# Patient Record
Sex: MALE | Race: WHITE | Employment: UNEMPLOYED | URBAN - METROPOLITAN AREA
[De-identification: names, ages, dates, MRNs, and addresses within clinical notes are randomized per-mention and may not be internally consistent; named-entity substitution may affect disease eponyms.]

---

## 2019-12-04 ENCOUNTER — TELEPHONE (OUTPATIENT)
Dept: CONSULT | Facility: CLINIC | Age: 34
End: 2019-12-04

## 2020-12-29 ENCOUNTER — ANCILLARY PROCEDURE (OUTPATIENT)
Dept: GENERAL RADIOLOGY | Facility: CLINIC | Age: 35
End: 2020-12-29
Attending: NURSE PRACTITIONER
Payer: COMMERCIAL

## 2020-12-29 ENCOUNTER — OFFICE VISIT (OUTPATIENT)
Dept: FAMILY MEDICINE | Facility: CLINIC | Age: 35
End: 2020-12-29
Payer: COMMERCIAL

## 2020-12-29 VITALS
SYSTOLIC BLOOD PRESSURE: 123 MMHG | BODY MASS INDEX: 30.01 KG/M2 | DIASTOLIC BLOOD PRESSURE: 96 MMHG | WEIGHT: 198 LBS | TEMPERATURE: 97.4 F | HEART RATE: 108 BPM | HEIGHT: 68 IN | OXYGEN SATURATION: 97 %

## 2020-12-29 DIAGNOSIS — M75.41 IMPINGEMENT SYNDROME, SHOULDER, RIGHT: ICD-10-CM

## 2020-12-29 DIAGNOSIS — I42.9 CARDIOMYOPATHY, UNSPECIFIED TYPE (H): Primary | ICD-10-CM

## 2020-12-29 DIAGNOSIS — F40.01 PANIC DISORDER WITH AGORAPHOBIA: ICD-10-CM

## 2020-12-29 DIAGNOSIS — I42.9 CARDIOMYOPATHY, UNSPECIFIED TYPE (H): ICD-10-CM

## 2020-12-29 DIAGNOSIS — R74.8 ELEVATED LIVER ENZYMES: ICD-10-CM

## 2020-12-29 PROBLEM — M54.42 CHRONIC MIDLINE LOW BACK PAIN WITH LEFT-SIDED SCIATICA: Status: ACTIVE | Noted: 2017-12-15

## 2020-12-29 PROBLEM — I42.0 CARDIOMYOPATHY, DILATED, NONISCHEMIC (H): Status: ACTIVE | Noted: 2020-12-29

## 2020-12-29 PROBLEM — F33.1 MAJOR DEPRESSIVE DISORDER, RECURRENT, MODERATE (H): Status: ACTIVE | Noted: 2017-08-01

## 2020-12-29 PROBLEM — I50.21 ACUTE SYSTOLIC (CONGESTIVE) HEART FAILURE (H): Status: ACTIVE | Noted: 2020-11-30

## 2020-12-29 PROBLEM — Z98.890 S/P LUMBAR DISCECTOMY: Status: ACTIVE | Noted: 2018-03-26

## 2020-12-29 PROBLEM — F12.11 CANNABIS USE DISORDER, MILD, IN EARLY REMISSION: Status: ACTIVE | Noted: 2017-08-01

## 2020-12-29 PROBLEM — I50.22 CHF (CONGESTIVE HEART FAILURE), NYHA CLASS III, CHRONIC, SYSTOLIC (H): Status: ACTIVE | Noted: 2020-11-27

## 2020-12-29 PROBLEM — G89.29 CHRONIC MIDLINE LOW BACK PAIN WITH LEFT-SIDED SCIATICA: Status: ACTIVE | Noted: 2017-12-15

## 2020-12-29 PROBLEM — F15.90 STIMULANT USE DISORDER: Status: ACTIVE | Noted: 2017-07-01

## 2020-12-29 PROBLEM — F41.1 GENERALIZED ANXIETY DISORDER: Status: ACTIVE | Noted: 2017-08-01

## 2020-12-29 LAB
ALBUMIN SERPL-MCNC: 3.7 G/DL (ref 3.4–5)
ALP SERPL-CCNC: 146 U/L (ref 40–150)
ALT SERPL W P-5'-P-CCNC: 127 U/L (ref 0–70)
ANION GAP SERPL CALCULATED.3IONS-SCNC: 2 MMOL/L (ref 3–14)
AST SERPL W P-5'-P-CCNC: 48 U/L (ref 0–45)
BILIRUB SERPL-MCNC: 1.3 MG/DL (ref 0.2–1.3)
BUN SERPL-MCNC: 20 MG/DL (ref 7–30)
CALCIUM SERPL-MCNC: 9.6 MG/DL (ref 8.5–10.1)
CHLORIDE SERPL-SCNC: 104 MMOL/L (ref 94–109)
CO2 SERPL-SCNC: 30 MMOL/L (ref 20–32)
CREAT SERPL-MCNC: 1.05 MG/DL (ref 0.66–1.25)
GFR SERPL CREATININE-BSD FRML MDRD: >90 ML/MIN/{1.73_M2}
GLUCOSE SERPL-MCNC: 114 MG/DL (ref 70–99)
POTASSIUM SERPL-SCNC: 5.1 MMOL/L (ref 3.4–5.3)
PROT SERPL-MCNC: 7.8 G/DL (ref 6.8–8.8)
SODIUM SERPL-SCNC: 136 MMOL/L (ref 133–144)

## 2020-12-29 PROCEDURE — 71046 X-RAY EXAM CHEST 2 VIEWS: CPT | Performed by: RADIOLOGY

## 2020-12-29 PROCEDURE — 36415 COLL VENOUS BLD VENIPUNCTURE: CPT | Performed by: NURSE PRACTITIONER

## 2020-12-29 PROCEDURE — 80053 COMPREHEN METABOLIC PANEL: CPT | Performed by: NURSE PRACTITIONER

## 2020-12-29 PROCEDURE — 99204 OFFICE O/P NEW MOD 45 MIN: CPT | Performed by: NURSE PRACTITIONER

## 2020-12-29 RX ORDER — GABAPENTIN 600 MG/1
600 TABLET ORAL 2 TIMES DAILY
COMMUNITY
Start: 2020-01-23

## 2020-12-29 RX ORDER — NALTREXONE HYDROCHLORIDE 50 MG/1
50 TABLET, FILM COATED ORAL
COMMUNITY
Start: 2020-11-30

## 2020-12-29 RX ORDER — MELOXICAM 15 MG/1
15 TABLET ORAL
COMMUNITY
Start: 2020-11-20

## 2020-12-29 RX ORDER — DULOXETINE 40 MG/1
40 CAPSULE, DELAYED RELEASE ORAL
COMMUNITY
End: 2021-02-09

## 2020-12-29 RX ORDER — ASPIRIN 81 MG/1
81 TABLET, CHEWABLE ORAL
COMMUNITY
Start: 2020-11-06

## 2020-12-29 RX ORDER — HYDROXYZINE HYDROCHLORIDE 25 MG/1
75-100 TABLET, FILM COATED ORAL 3 TIMES DAILY
COMMUNITY

## 2020-12-29 RX ORDER — SPIRONOLACTONE 25 MG/1
12.5 TABLET ORAL
COMMUNITY
Start: 2020-12-04

## 2020-12-29 RX ORDER — FUROSEMIDE 20 MG
60 TABLET ORAL DAILY
COMMUNITY
Start: 2020-12-03

## 2020-12-29 RX ORDER — GABAPENTIN 100 MG/1
100 CAPSULE ORAL 3 TIMES DAILY
COMMUNITY

## 2020-12-29 RX ORDER — LAMOTRIGINE 25 MG/1
50 TABLET ORAL
COMMUNITY
End: 2021-01-01

## 2020-12-29 RX ORDER — VALSARTAN 160 MG/1
160 TABLET ORAL 2 TIMES DAILY
COMMUNITY
Start: 2020-12-03

## 2020-12-29 ASSESSMENT — MIFFLIN-ST. JEOR: SCORE: 1806.59

## 2020-12-29 NOTE — LETTER
January 6, 2021      Jamin Le  [address] [facility] [facility] [address]        Dear ,    We are writing to inform you of your test results. We have been unable to reach you by phone.     Please call patient and let him know that his labs are normal except for elevated liver enzymes.  I attempted to call him and was unable to reach him.  I am ordering urine testing and labs testing for follow-up along with an US.  He can make an appointment in radiology and lab to complete these and then he should follow-up with a primary care provider to establish care.  I will also put in a referral to psychiatry for follow-up on his anxiety and panic attacks for further evaluation and treatment.  Let me know if he has any questions.  Lidia Lopez NP     Please call [phone] to schedule lab. Ultrasound can be scheduled by calling [phone].     Psychiatry will call you to schedule a appointment. If you do not receive a call, please call the clinic at [phone].       Resulted Orders   Comprehensive metabolic panel (BMP + Alb, Alk Phos, ALT, AST, Total. Bili, TP)   Result Value Ref Range    Sodium 136 133 - 144 mmol/L    Potassium 5.1 3.4 - 5.3 mmol/L    Chloride 104 94 - 109 mmol/L    Carbon Dioxide 30 20 - 32 mmol/L    Anion Gap 2 (L) 3 - 14 mmol/L    Glucose 114 (H) 70 - 99 mg/dL      Comment:      Non Fasting    Urea Nitrogen 20 7 - 30 mg/dL    Creatinine 1.05 0.66 - 1.25 mg/dL    GFR Estimate >90 >60 mL/min/[1.73_m2]      Comment:      Non  GFR Calc  Starting 12/18/2018, serum creatinine based estimated GFR (eGFR) will be   calculated using the Chronic Kidney Disease Epidemiology Collaboration   (CKD-EPI) equation.      GFR Estimate If Black >90 >60 mL/min/[1.73_m2]      Comment:       GFR Calc  Starting 12/18/2018, serum creatinine based estimated GFR (eGFR) will be   calculated using the Chronic Kidney Disease Epidemiology Collaboration   (CKD-EPI) equation.       Calcium 9.6 8.5 - 10.1 mg/dL    Bilirubin Total 1.3 0.2 - 1.3 mg/dL    Albumin 3.7 3.4 - 5.0 g/dL    Protein Total 7.8 6.8 - 8.8 g/dL    Alkaline Phosphatase 146 40 - 150 U/L     (H) 0 - 70 U/L    AST 48 (H) 0 - 45 U/L       If you have any questions or concerns, please call the clinic at the number listed above.       Sincerely,      Lidia Lopez NP

## 2020-12-29 NOTE — PROGRESS NOTES
Jamin Le is a 35 year old male who presents to clinic today for the following health issues:    HPI           Hospital Follow-up Visit:    Hospital/Nursing Home/IP Rehab Facility: [facility]  Date of Admission: 11/30/20  Date of Discharge: 12/03/20  Reason(s) for Admission: Cardiomyopathy       Was your hospitalization related to COVID-19? No   Problems taking medications regularly:  Sometimes because he forgets   Medication changes since discharge: Yes   Medication changes included:  - Lower dose lisinopril 5mg to higher dose valsartan 160 mg once daily to ease transition to Entresto if pursued as outpatient  - Carvedilol stopped at this time given recent decompensated heart failure and poor tolerance upon attempted restart  - Furosemide decreased to 60 mg daily from twice daily   - Spironolactone 12.5 mg daily started  - Other medications remained the same       Problems adhering to non-medication therapy:  None    Summary of hospitalization:  CareEverywhere information obtained and reviewed  Diagnostic Tests/Treatments reviewed.  Follow up needed: labs and CXR due to shortness of breath today  Other Healthcare Providers Involved in Patient s Care:         None  Update since discharge: fluctuating course.   Post Discharge Medication Reconciliation: discharge medications reconciled, continue medications without change.  Plan of care communicated with patient       Patient states he weighed 190# at discharge 3 weeks ago.  Patient states that he was feeling good for a couple days and then breathing worsened again.  Struggles with methamphetamines use on and off.  States he feels better when he takes that.  Compliant per patient with current medication regimen for anxiety.  Current shortness of breath during beginning of visit but improved by end of visit.  Patient has history of agoraphobia with panic attack.     Musculoskeletal problem/pain  Onset/Duration: 5 months  Description  Location:  "shoulder - right  Joint Swelling: unknown   Redness: unknown   Pain: YES- radiation to his elbow  Warmth: unknown   Intensity:  severe  Progression of Symptoms:  waxing and waning  Accompanying signs and symptoms:   Fevers: no  Numbness/tingling/weakness: YES- weakness  History  Trauma to the area: no  Recent illness:  no  Previous similar problem: YES  Previous evaluation:  YES  Was told from [facility] that he has a torn rotator cuff  Precipitating or alleviating factors:  Aggravating factors include: movement and laying on it   Therapies tried and outcome: ice, stretching, exercises, acetaminophen and Ibuprofen    Patient is complaining about his right shoulder have pain.  He states that in the past he had been told he tore his rotator cuff and did not do surgery at that time but symptoms are starting to occur again with pain.     Review of Systems   CONSTITUTIONAL: NEGATIVE for fever, chills, change in weight  ENT/MOUTH: NEGATIVE for ear, mouth and throat problems  RESP:POSITIVE for shortness of breath during beginning of appointment then improved by the end  CV: NEGATIVE for chest pain, palpitations or peripheral edema  MS:  Patient has pain in right shoulder  PSYCHIATRIC: POSITIVE forHx anxiety, Hx depression, Hx panic attacks and agoraphobia  ROS otherwise negative      Objective    BP (!) 123/96   Pulse 108   Temp 97.4  F (36.3  C) (Tympanic)   Ht 1.734 m (5' 8.25\")   Wt 89.8 kg (198 lb)   SpO2 97%   BMI 29.89 kg/m    Body mass index is 29.89 kg/m .  Physical Exam   GENERAL: healthy, alert and no distress  EYES: Eyes grossly normal to inspection, PERRL and conjunctivae and sclerae normal  NECK: no adenopathy, no asymmetry, masses, or scars and thyroid normal to palpation  RESP: lungs clear to auscultation - no rales, rhonchi or wheezes  CV: tachycardia, normal S1 S2, no S3 or S4, grade 3/6 blowing murmur heard best over the base of the heart, it is audible in upper chest but very mildly, peripheral pulses " strong and no peripheral edema  MS:  Mild limited active range of motion with right arm, negative drop arm testing, no pain with abduction or adduction, mild impingement with Neer's testing; passive ROM is normal.  ABDOMEN: soft, nontender, no hepatosplenomegaly, no masses and bowel sounds normal  PSYCH: anxious    Results for orders placed or performed in visit on 12/29/20   XR Chest 2 Views     Status: None    Narrative    CHEST TWO VIEWS December 29, 2020 3:16 PM     HISTORY: Cardiomyopathy, unspecified type (H).    COMPARISON: None.      Impression    IMPRESSION: PA and lateral views of the chest. Lungs are clear.  Cardiomegaly. No effusions are evident. No pneumothorax.    KARSTEN PARRA MD   Results for orders placed or performed in visit on 12/29/20   Comprehensive metabolic panel (BMP + Alb, Alk Phos, ALT, AST, Total. Bili, TP)     Status: Abnormal   Result Value Ref Range    Sodium 136 133 - 144 mmol/L    Potassium 5.1 3.4 - 5.3 mmol/L    Chloride 104 94 - 109 mmol/L    Carbon Dioxide 30 20 - 32 mmol/L    Anion Gap 2 (L) 3 - 14 mmol/L    Glucose 114 (H) 70 - 99 mg/dL    Urea Nitrogen 20 7 - 30 mg/dL    Creatinine 1.05 0.66 - 1.25 mg/dL    GFR Estimate >90 >60 mL/min/[1.73_m2]    GFR Estimate If Black >90 >60 mL/min/[1.73_m2]    Calcium 9.6 8.5 - 10.1 mg/dL    Bilirubin Total 1.3 0.2 - 1.3 mg/dL    Albumin 3.7 3.4 - 5.0 g/dL    Protein Total 7.8 6.8 - 8.8 g/dL    Alkaline Phosphatase 146 40 - 150 U/L     (H) 0 - 70 U/L    AST 48 (H) 0 - 45 U/L           Assessment & Plan     Cardiomyopathy, unspecified type (H)  CMP shows normal electrolytes and kidney function.  He does have some elevation in ALT and AST (see note below).  Chest x-ray is normal.  I feel that the breathing is anxiety at this time.  He was taken off of Lamictal from his last provider in [facility] and is currently on hydroxyzine which he does not take because it does not help and cariprazine an antipsychotic along with gabapentin.  I am  recommending that he follow-up with cardiology since he wants to move his care here and that he brings the copy of his ECHO that was done recently along with him.  - Comprehensive metabolic panel (BMP + Alb, Alk Phos, ALT, AST, Total. Bili, TP)  - XR Chest 2 Views; Future  - CARDIOLOGY EVAL ADULT REFERRAL; Future    Impingement syndrome, shoulder, right  Patient's exam shows shoulder impingement.  He declines Physical Therapy at this time.  Referral given to sports medicine for steroid injection for pain control.  Recommended exercises with arms to reduce chance of frozen arm.  - Orthopedic & Spine  Referral; Future    Elevated liver enzymes  I will add on labs for Hep B and C labs, urine drug toxicity due to his drug use, iron/TIBC, CBC/diff, and US of the liver for further evaluation.  I recommend follow-up with PCP after testing is completed.    Panic disorder with agoraphobia  I recommend referral to psychiatry for treatment.  Referral order placed.     See Patient Instructions    Return in about 1 week (around 1/5/2021) for asap with cardiology.    Lidia Lopez NP  [facility]

## 2020-12-29 NOTE — PATIENT INSTRUCTIONS
1.  Labs today.  2.  Chest Xray today.  3.  I will contact you with results.  4.  Make appointment with cardiology in [place].  5.  Get a copy of your Echo to take with you to the cardiology appointment.  6.  If any worsening symptoms with breathing, follow-up in ER.

## 2021-01-19 ENCOUNTER — OFFICE VISIT (OUTPATIENT)
Dept: ORTHOPEDICS | Facility: CLINIC | Age: 36
End: 2021-01-19
Payer: COMMERCIAL

## 2021-01-19 VITALS — HEIGHT: 69 IN | BODY MASS INDEX: 29.33 KG/M2 | WEIGHT: 198 LBS | RESPIRATION RATE: 14 BRPM

## 2021-01-19 DIAGNOSIS — M50.10 CERVICAL DISC SYNDROME: ICD-10-CM

## 2021-01-19 PROCEDURE — 99243 OFF/OP CNSLTJ NEW/EST LOW 30: CPT | Performed by: ORTHOPAEDIC SURGERY

## 2021-01-19 ASSESSMENT — MIFFLIN-ST. JEOR: SCORE: 1810.56

## 2021-01-19 NOTE — LETTER
1/19/2021         RE: Jamin Le  [address] [facility] [address]        Dear Colleague,    Thank you for referring your patient, Jamin Le, to the [facility]. Please see a copy of my visit note below.    Jamin Le is a 35 year old male who is seen in consultation at the request of Lidia Lopez for right shoulder pain.  This is been going on for close to a year but getting gradually worse.  Hurts most when he is sleeping on that shoulder.  He describes sharp, aching, shooting, constant pains rated up to 9-10 out of 10.  He has been taking meloxicam 15 mg/day for pain.  He is also on psych medications and gabapentin.  He recalls an injury to the shoulder about 10 years ago where he could not lift it for several months.  It seemed to resolve on its own.  He is not currently employed.  Has had hospitalization recently for medical issues with cardiomyopathy, anxiety and panic disorder, methamphetamine use.    We do not have x-rays of the shoulders, but a chest x-ray in December does partially show the shoulders and does not indicate significant arthritis.  He does have some spurring at the acromioclavicular joint bilaterally.    History reviewed. No pertinent past medical history.    History reviewed. No pertinent surgical history.    History reviewed. No pertinent family history.    Social History     Socioeconomic History     Marital status: Single     Spouse name: Not on file     Number of children: Not on file     Years of education: Not on file     Highest education level: Not on file   Occupational History     Not on file   Social Needs     Financial resource strain: Not on file     Food insecurity     Worry: Not on file     Inability: Not on file     Transportation needs     Medical: Not on file     Non-medical: Not on file   Tobacco Use     Smoking status: Current Every Day Smoker     Types: Cigarettes     Smokeless tobacco: Former User   Substance and  Sexual Activity     Alcohol use: Not on file     Drug use: Not on file     Sexual activity: Not on file   Lifestyle     Physical activity     Days per week: Not on file     Minutes per session: Not on file     Stress: Not on file   Relationships     Social connections     Talks on phone: Not on file     Gets together: Not on file     Attends Rastafarian service: Not on file     Active member of club or organization: Not on file     Attends meetings of clubs or organizations: Not on file     Relationship status: Not on file     Intimate partner violence     Fear of current or ex partner: Not on file     Emotionally abused: Not on file     Physically abused: Not on file     Forced sexual activity: Not on file   Other Topics Concern     Not on file   Social History Narrative     Not on file       Current Outpatient Medications   Medication Sig Dispense Refill     aspirin (ASA) 81 MG chewable tablet Take 81 mg by mouth       cariprazine (VRAYLAR) 3 MG CAPS capsule Take 3 mg by mouth       DULoxetine HCl 40 MG CPEP Take 40 mg by mouth       furosemide (LASIX) 20 MG tablet Take 60 mg by mouth       gabapentin (NEURONTIN) 100 MG capsule Take 100 mg by mouth       gabapentin (NEURONTIN) 600 MG tablet TAKE 1 TABLET BY MOUTH EVERY MORNING AND TAKE 2 TABLETS BY MOUTH EVERY EVENING       hydrOXYzine (ATARAX) 25 MG tablet Take  mg by mouth       meloxicam (MOBIC) 15 MG tablet Take 15 mg by mouth       naltrexone (DEPADE/REVIA) 50 MG tablet Take 50 mg by mouth       spironolactone (ALDACTONE) 25 MG tablet Take 12.5 mg by mouth       valsartan (DIOVAN) 160 MG tablet Take 160 mg by mouth         Allergies   Allergen Reactions     Penicillins Hives     Tolerated po cephalosporin in 2016         REVIEW OF SYSTEMS:  CONSTITUTIONAL:  NEGATIVE for fever, chills, change in weight, not feeling tired  SKIN:  NEGATIVE for worrisome rashes, no skin lumps, no skin ulcers and no non-healing wounds  EYES:  NEGATIVE for vision changes or  "irritation.  ENT/MOUTH:  NEGATIVE.  No hearing loss, no hoarseness, no difficulty swallowing.  RESP:  NEGATIVE. No cough or shortness of breath.  CV:  NEGATIVE for chest pain, palpitations or peripheral edema  GI:  NEGATIVE for nausea, abdominal pain, heartburn, or change in bowel habits  :  Negative. No dysuria, no hematuria  MUSCULOSKELETAL:  See HPI above  NEURO:  NEGATIVE . No headaches, no dizziness,  no numbness  ENDOCRINE:  NEGATIVE for temperature intolerance, skin/hair changes  HEME/ALLERGY/IMMUNE:  NEGATIVE for bleeding problems  PSYCHIATRIC:  NEGATIVE. no anxiety, no depression.     Exam:  Vitals: Resp 14   Ht 1.74 m (5' 8.5\")   Wt 89.8 kg (198 lb)   BMI 29.67 kg/m    BMI= Body mass index is 29.67 kg/m .  Constitutional:  healthy, alert and no distress  Neuro: Alert and Oriented x 3, Sensation grossly WNL.  Psych: Affect normal   Respiratory: Breathing not labored.  Cardiovascular: normal peripheral pulses  Lymph: no adenopathy  Skin: No rashes,worrisome lesions or skin problems  He has full motion of the neck but has some pain with the motion.  He has significant and severe tenderness in the trapezius and rhomboids bilaterally but more on the right.  He had prominence of the AC joint, but no tenderness there.  He had no tenderness today in the subacromial space on either shoulder.  He has full range of motion of both shoulders.  No pain with this motion.  He has no pain with resisted internal rotation, external rotation, or abduction down at his side.  He had no pain with resisted blocking test or empty can test.  He does have pain on the right with Lambert test, negative on the left.  He had negative anterior and posterior apprehension testing.  Sensation, motor and circulation are intact.    Assessment: I feel his right shoulder and arm pain are due to the neck.  I feel his shoulder is fine.    Plan: Posture and strengthening exercises advised for the neck.  I have offered physical therapy, but he " wishes to look online for this.  Continue meloxicam 50 mg/day.      Again, thank you for allowing me to participate in the care of your patient.        Sincerely,        Anibal Welsh MD

## 2021-01-19 NOTE — PROGRESS NOTES
Jamin Le is a 35 year old male who is seen in consultation at the request of Lidia Lopez for right shoulder pain.  This is been going on for close to a year but getting gradually worse.  Hurts most when he is sleeping on that shoulder.  He describes sharp, aching, shooting, constant pains rated up to 9-10 out of 10.  He has been taking meloxicam 15 mg/day for pain.  He is also on psych medications and gabapentin.  He recalls an injury to the shoulder about 10 years ago where he could not lift it for several months.  It seemed to resolve on its own.  He is not currently employed.  Has had hospitalization recently for medical issues with cardiomyopathy, anxiety and panic disorder, methamphetamine use.    We do not have x-rays of the shoulders, but a chest x-ray in December does partially show the shoulders and does not indicate significant arthritis.  He does have some spurring at the acromioclavicular joint bilaterally.    History reviewed. No pertinent past medical history.    History reviewed. No pertinent surgical history.    History reviewed. No pertinent family history.    Social History     Socioeconomic History     Marital status: Single     Spouse name: Not on file     Number of children: Not on file     Years of education: Not on file     Highest education level: Not on file   Occupational History     Not on file   Social Needs     Financial resource strain: Not on file     Food insecurity     Worry: Not on file     Inability: Not on file     Transportation needs     Medical: Not on file     Non-medical: Not on file   Tobacco Use     Smoking status: Current Every Day Smoker     Types: Cigarettes     Smokeless tobacco: Former User   Substance and Sexual Activity     Alcohol use: Not on file     Drug use: Not on file     Sexual activity: Not on file   Lifestyle     Physical activity     Days per week: Not on file     Minutes per session: Not on file     Stress: Not on file   Relationships      Social connections     Talks on phone: Not on file     Gets together: Not on file     Attends Amish service: Not on file     Active member of club or organization: Not on file     Attends meetings of clubs or organizations: Not on file     Relationship status: Not on file     Intimate partner violence     Fear of current or ex partner: Not on file     Emotionally abused: Not on file     Physically abused: Not on file     Forced sexual activity: Not on file   Other Topics Concern     Not on file   Social History Narrative     Not on file       Current Outpatient Medications   Medication Sig Dispense Refill     aspirin (ASA) 81 MG chewable tablet Take 81 mg by mouth       cariprazine (VRAYLAR) 3 MG CAPS capsule Take 3 mg by mouth       DULoxetine HCl 40 MG CPEP Take 40 mg by mouth       furosemide (LASIX) 20 MG tablet Take 60 mg by mouth       gabapentin (NEURONTIN) 100 MG capsule Take 100 mg by mouth       gabapentin (NEURONTIN) 600 MG tablet TAKE 1 TABLET BY MOUTH EVERY MORNING AND TAKE 2 TABLETS BY MOUTH EVERY EVENING       hydrOXYzine (ATARAX) 25 MG tablet Take  mg by mouth       meloxicam (MOBIC) 15 MG tablet Take 15 mg by mouth       naltrexone (DEPADE/REVIA) 50 MG tablet Take 50 mg by mouth       spironolactone (ALDACTONE) 25 MG tablet Take 12.5 mg by mouth       valsartan (DIOVAN) 160 MG tablet Take 160 mg by mouth         Allergies   Allergen Reactions     Penicillins Hives     Tolerated po cephalosporin in 2016         REVIEW OF SYSTEMS:  CONSTITUTIONAL:  NEGATIVE for fever, chills, change in weight, not feeling tired  SKIN:  NEGATIVE for worrisome rashes, no skin lumps, no skin ulcers and no non-healing wounds  EYES:  NEGATIVE for vision changes or irritation.  ENT/MOUTH:  NEGATIVE.  No hearing loss, no hoarseness, no difficulty swallowing.  RESP:  NEGATIVE. No cough or shortness of breath.  CV:  NEGATIVE for chest pain, palpitations or peripheral edema  GI:  NEGATIVE for nausea, abdominal pain,  "heartburn, or change in bowel habits  :  Negative. No dysuria, no hematuria  MUSCULOSKELETAL:  See HPI above  NEURO:  NEGATIVE . No headaches, no dizziness,  no numbness  ENDOCRINE:  NEGATIVE for temperature intolerance, skin/hair changes  HEME/ALLERGY/IMMUNE:  NEGATIVE for bleeding problems  PSYCHIATRIC:  NEGATIVE. no anxiety, no depression.     Exam:  Vitals: Resp 14   Ht 1.74 m (5' 8.5\")   Wt 89.8 kg (198 lb)   BMI 29.67 kg/m    BMI= Body mass index is 29.67 kg/m .  Constitutional:  healthy, alert and no distress  Neuro: Alert and Oriented x 3, Sensation grossly WNL.  Psych: Affect normal   Respiratory: Breathing not labored.  Cardiovascular: normal peripheral pulses  Lymph: no adenopathy  Skin: No rashes,worrisome lesions or skin problems  He has full motion of the neck but has some pain with the motion.  He has significant and severe tenderness in the trapezius and rhomboids bilaterally but more on the right.  He had prominence of the AC joint, but no tenderness there.  He had no tenderness today in the subacromial space on either shoulder.  He has full range of motion of both shoulders.  No pain with this motion.  He has no pain with resisted internal rotation, external rotation, or abduction down at his side.  He had no pain with resisted blocking test or empty can test.  He does have pain on the right with Lambert test, negative on the left.  He had negative anterior and posterior apprehension testing.  Sensation, motor and circulation are intact.    Assessment: I feel his right shoulder and arm pain are due to the neck.  I feel his shoulder is fine.    Plan: Posture and strengthening exercises advised for the neck.  I have offered physical therapy, but he wishes to look online for this.  Continue meloxicam 50 mg/day.  "

## 2021-01-19 NOTE — PATIENT INSTRUCTIONS
SMOKING CESSATION  What's in cigarette smoke? - Cigarette smoke contains over 4,000 chemicals. Nicotine is one of the main ingredients which is an insecticide/herbicide. It is poisonous to our nervous system, increases blood clotting risk, and decreases the body's defenses to fight off infection. Another chemical is Carbon Monoxide is an asphyxiating gas that permanently binds to blood cells and blocks the transport of oxygen. This leads to tissue death and decreases your metabolism. Tar is a chemical that coats your lungs and trachea which impairs new oxygen coming in and carbon dioxide getting out of your body.   How does smoking impact surgery? - Smoking is particularly hazardous with regards to surgery. Surgery puts stress on the body and a smoker's body is already under strain from these chemicals. Putting the two together, especially for an elective surgery, could be a recipe for disaster. Smoking before and after surgery increases your risk of heart problems, slow wound healing, delayed bone healing, blood clots, wound infection and anesthesia complications.   What are the benefits of quitting? - In 20 minutes your blood pressure will drop back down to normal. In 8 hours the carbon monoxide (a toxic gas) levels in your blood stream will drop by half, and oxygen levels will return to normal. In 48 hours your chance of having a heart attack will have decreased. All nicotine will have left your body. Your sense of taste and smell will return to a normal level. In 72 hours your bronchial tubes will relax, and your energy levels will increase. In 2 weeks your circulation will increase, and it will continue to improve for the next 10 weeks.    Recommendations for elective surgery - Ideally, patients should quit smoking 8 weeks before and at least 2 weeks after elective surgery in order to avoid complications. Simply cutting back on the amount of cigarettes smoked per day does not offer any benefit or decrease the  risk of poor wound healing, heart problems, and infection. Smokers should also start taking Vitamin C and B for two weeks before surgery and two weeks after surgery.    Ways to Stop Smokin. Nicotine patches, lozenges, or gum  2. Support Groups  3. Medications (see below)    List of Medications:  1. Varenicline Tartrate (CHANTIX)   2. Bupropion HCL (WELLBUTRIN, ZYBAN) - note: make sure Wellbutrin is for smoking cessation and not other issues   3. Nicotine Patch (NICODERM)   4. Nicotine Inhaler (NICOTROL)   5. Nicotine Gum Nicotine Polacrilex   6. Nicotine Lozenge: Nicotine Polacrilex (COMMIT)   * [facility] offers a smoking support group as well!  Please visit: [email or web address]  If you are interested in these, ask about getting a prescription or talk to your primary care doctor about what may be the best way for you to quit.

## 2021-02-01 ENCOUNTER — OFFICE VISIT (OUTPATIENT)
Dept: CARDIOLOGY | Facility: CLINIC | Age: 36
End: 2021-02-01
Attending: NURSE PRACTITIONER
Payer: COMMERCIAL

## 2021-02-01 ENCOUNTER — HOSPITAL ENCOUNTER (OUTPATIENT)
Dept: CARDIOLOGY | Facility: CLINIC | Age: 36
Discharge: HOME | End: 2021-02-01
Attending: INTERNAL MEDICINE | Admitting: INTERNAL MEDICINE
Payer: COMMERCIAL

## 2021-02-01 VITALS
OXYGEN SATURATION: 95 % | DIASTOLIC BLOOD PRESSURE: 88 MMHG | BODY MASS INDEX: 29.73 KG/M2 | HEART RATE: 118 BPM | SYSTOLIC BLOOD PRESSURE: 112 MMHG | WEIGHT: 200.7 LBS | HEIGHT: 69 IN

## 2021-02-01 DIAGNOSIS — I42.9 CARDIOMYOPATHY, UNSPECIFIED TYPE (H): ICD-10-CM

## 2021-02-01 DIAGNOSIS — I50.22 CHRONIC SYSTOLIC HEART FAILURE (H): Primary | ICD-10-CM

## 2021-02-01 PROCEDURE — 93010 ELECTROCARDIOGRAM REPORT: CPT | Performed by: INTERNAL MEDICINE

## 2021-02-01 PROCEDURE — 93005 ELECTROCARDIOGRAM TRACING: CPT | Performed by: REHABILITATION PRACTITIONER

## 2021-02-01 PROCEDURE — 99244 OFF/OP CNSLTJ NEW/EST MOD 40: CPT | Performed by: INTERNAL MEDICINE

## 2021-02-01 RX ORDER — METOPROLOL SUCCINATE 50 MG/1
50 TABLET, EXTENDED RELEASE ORAL DAILY
Qty: 60 TABLET | Refills: 11 | Status: SHIPPED | OUTPATIENT
Start: 2021-02-01

## 2021-02-01 RX ORDER — ASPIRIN 81 MG/1
81 TABLET, CHEWABLE ORAL DAILY
Qty: 90 TABLET | Refills: 3 | Status: CANCELLED | OUTPATIENT
Start: 2021-02-01

## 2021-02-01 ASSESSMENT — MIFFLIN-ST. JEOR: SCORE: 1822.81

## 2021-02-01 NOTE — LETTER
2/1/2021    Physician No Ref-Primary  No address on file    RE: Jamin NYE Rey       Dear Colleague,    I had the pleasure of seeing Jamin POPEYE Le in the [facility].    HISTORY OF PRESENT ILLNESS:  100 feet exertional dyspnea. Orthopnea. Sleeps in chair or with  Pillow. No PND. Has taken extra lasix.   Disabled unable to work at this time. 4 children. Wants to get back to work. Currently sober x 2 weeks  Orders this Visit:    Back surgery   Allergies PCN  Currently no drugs or etoh at this time    Orders Placed This Encounter   Procedures     EKG 12-LEAD CLINIC READ     No orders of the defined types were placed in this encounter.    There are no discontinued medications.    Encounter Diagnosis   Name Primary?     Cardiomyopathy, unspecified type (H)        CURRENT MEDICATIONS:  Current Outpatient Medications   Medication Sig Dispense Refill     cariprazine (VRAYLAR) 3 MG CAPS capsule Take 3 mg by mouth       DULoxetine HCl 40 MG CPEP Take 40 mg by mouth       furosemide (LASIX) 20 MG tablet Take 60 mg by mouth       gabapentin (NEURONTIN) 100 MG capsule Take 100 mg by mouth       gabapentin (NEURONTIN) 600 MG tablet TAKE 1 TABLET BY MOUTH EVERY MORNING AND TAKE 2 TABLETS BY MOUTH EVERY EVENING       meloxicam (MOBIC) 15 MG tablet Take 15 mg by mouth       naltrexone (DEPADE/REVIA) 50 MG tablet Take 50 mg by mouth       spironolactone (ALDACTONE) 25 MG tablet Take 12.5 mg by mouth       valsartan (DIOVAN) 160 MG tablet Take 160 mg by mouth       aspirin (ASA) 81 MG chewable tablet Take 81 mg by mouth       hydrOXYzine (ATARAX) 25 MG tablet Take  mg by mouth         ALLERGIES     Allergies   Allergen Reactions     Penicillins Hives     Tolerated po cephalosporin in 2016         PAST MEDICAL, SURGICAL, FAMILY, SOCIAL HISTORY:  History was reviewed and updated as needed, see medical record.    Review of Systems:  A 12-point review of systems was completed, see medical record  "for detailed review of systems information.    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 May benefit from an increase in furosemide. Will  Need to maximize neurohormonal inhibitors and re check LVEF to decide about  ICD       RECOMMENDATIONS:   Increase furosemide to 80 daily  Add metoprolol xl 50 titrate upward  Continue spironolactone 25 daily  Continue valsartan 160 titrate upward    Recent Lab Results:  LIPID RESULTS:  No results found for: CHOL, HDL, LDL, TRIG, CHOLHDLRATIO    LIVER ENZYME RESULTS:  Lab Results   Component Value Date    AST 48 (H) 12/29/2020     (H) 12/29/2020       CBC RESULTS:  No results found for: WBC, RBC, HGB, HCT, MCV, MCH, MCHC, RDW, PLT    BMP RESULTS:  Lab Results   Component Value Date     12/29/2020    POTASSIUM 5.1 12/29/2020    CHLORIDE 104 12/29/2020    CO2 30 12/29/2020    ANIONGAP 2 (L) 12/29/2020     (H) 12/29/2020    BUN 20 12/29/2020    CR 1.05 12/29/2020    GFRESTIMATED >90 12/29/2020    GFRESTBLACK >90 12/29/2020    GWENDOLYN 9.6 12/29/2020        A1C RESULTS:  No results found for: A1C    INR RESULTS:  No results found for: INR    We greatly appreciate the opportunity to be involved in the care of your patient, Jamin Le.    Sincerely,  Nico Perez MD      CC  Lidia Lopez, NP  [address] [place] [address]                                                                         Thank you for allowing me to participate in " the care of your patient.      Sincerely,     Nico Perez MD     [facility] [place]    cc:   Lidia Lopez, NICHOL  [address] [place] [address]

## 2021-02-01 NOTE — PROGRESS NOTES
"HISTORY OF PRESENT ILLNESS:  100 feet exertional dyspnea. Orthopnea. Sleeps in chair or with  Pillow. No PND. Has taken extra lasix.   Disabled unable to work at this time. 4 children. Wants to get back to work. Currently sober x 2 weeks  Orders this Visit:    Back surgery   Allergies PCN  Currently no drugs or etoh at this time    Orders Placed This Encounter   Procedures     EKG 12-LEAD CLINIC READ     No orders of the defined types were placed in this encounter.    There are no discontinued medications.    Encounter Diagnosis   Name Primary?     Cardiomyopathy, unspecified type (H)        CURRENT MEDICATIONS:  Current Outpatient Medications   Medication Sig Dispense Refill     cariprazine (VRAYLAR) 3 MG CAPS capsule Take 3 mg by mouth       DULoxetine HCl 40 MG CPEP Take 40 mg by mouth       furosemide (LASIX) 20 MG tablet Take 60 mg by mouth       gabapentin (NEURONTIN) 100 MG capsule Take 100 mg by mouth       gabapentin (NEURONTIN) 600 MG tablet TAKE 1 TABLET BY MOUTH EVERY MORNING AND TAKE 2 TABLETS BY MOUTH EVERY EVENING       meloxicam (MOBIC) 15 MG tablet Take 15 mg by mouth       naltrexone (DEPADE/REVIA) 50 MG tablet Take 50 mg by mouth       spironolactone (ALDACTONE) 25 MG tablet Take 12.5 mg by mouth       valsartan (DIOVAN) 160 MG tablet Take 160 mg by mouth       aspirin (ASA) 81 MG chewable tablet Take 81 mg by mouth       hydrOXYzine (ATARAX) 25 MG tablet Take  mg by mouth         ALLERGIES     Allergies   Allergen Reactions     Penicillins Hives     Tolerated po cephalosporin in 2016         PAST MEDICAL, SURGICAL, FAMILY, SOCIAL HISTORY:  History was reviewed and updated as needed, see medical record.    Review of Systems:  A 12-point review of systems was completed, see medical record for detailed review of systems information.    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 May benefit from an increase in furosemide. Will  Need to maximize neurohormonal inhibitors and re check LVEF to decide about  ICD       RECOMMENDATIONS:   Increase furosemide to 80 daily  Add metoprolol xl 50 titrate upward  Continue spironolactone 25 daily  Continue valsartan 160 titrate upward    Recent Lab Results:  LIPID RESULTS:  No results found for: CHOL, HDL, LDL, TRIG, CHOLHDLRATIO    LIVER ENZYME RESULTS:  Lab Results   Component Value Date    AST 48 (H) 12/29/2020     (H) 12/29/2020       CBC RESULTS:  No results found for: WBC, RBC, HGB, HCT, MCV, MCH, MCHC, RDW, PLT    BMP RESULTS:  Lab Results   Component Value Date     12/29/2020    POTASSIUM 5.1 12/29/2020    CHLORIDE 104 12/29/2020    CO2 30 12/29/2020    ANIONGAP 2 (L) 12/29/2020     (H) 12/29/2020    BUN 20 12/29/2020    CR 1.05 12/29/2020    GFRESTIMATED >90 12/29/2020    GFRESTBLACK >90 12/29/2020    GWENDOLYN 9.6 12/29/2020        A1C RESULTS:  No results found for: A1C    INR RESULTS:  No results found for: INR    We greatly appreciate the opportunity to be involved in the care of your patient, Jamin Le.    Sincerely,  Nico Perez MD      CC  Lidia Lopez, NICHOL  [address] [place] [address]

## 2021-02-01 NOTE — LETTER
2/1/2021      Lidia Lopez NP   [facility]   [address] [place] [address]       RE: Jamin Le       Dear Colleague,    I had the pleasure of seeing Jamin Le in the [facility].    Service Date: 02/01/2021      HISTORY OF PRESENT ILLNESS:  Jamin Le, a 35-year-old man with established diagnosis of nonischemic dilated cardiomyopathy, substance abuse (methamphetamine/alcohol) and panic disorder with agoraphobia was seen today at your request for recommendations regarding long-term management of chronic systolic heart failure.      Mr. Le has an established diagnosis of chronic systolic heart failure in the setting of substance abuse.  A CT coronary angiogram in 2017 showed normal coronaries and TTE demonstrated a severe reduction in LV systolic performance. He was placed on neurohormonal inhibitors and has been followed as an outpatient  by the [facility] cardiology service at [facility].      The patient was most recently hospitalized for acute decompenstated  systolic heart failure between 11/30/2020 and 12/03/2020 at the [facility].  His beta blocker was held.. An echocardiogram demonstrated  a left ventricular end-diastolic dimension of 7 cm, LV ejection fraction of 15%-20% and diffuse left ventricular hypokinesis.  There was mild to  moderate mitral insufficiency.  RV chamber size was normal with moderately reduced RV systolic performance  He received aggressive diuresis and was discharged on a combination of valsartan, spironolactone and furosemide 60 mg daily.     Upon discharge from [facility] , the patient elected to follow up  in the [place] system and was seen for an initial visit on 12/09/2020 by his nurse practitioner. She prescribed his cardiac medications,   referred the patient to a psychiatrist for  management of substance abuse and requested cardiology follow up  at the [facility]. The patient admits he had been using illicit substances prior to his recent hospital admission, but has been able to remain sober since discharge. He fears he is prone to relapse and has actively sought help with his current primary care providers.      Mr. Le indicates he initially struggled upon discharge from the hospital, but has gradually improved.  He s suffers from orthopnea, but is free of PND and sleeps either on his side or on extra pillows..  He experiences dyspnea at about 100 feet before he experiences dyspnea. At times he takes  an extra 20 mg of furosemide, which has modestly  improved his symptoms.      His weight today is 91 kg (200 pounds).      ALLERGIES:  Penicillin manifested BY hives.        HABITS:  The patient has a longstanding history of substance abuse that involves both alcohol and methamphetamines.  He states he has been sober for several weeks at this time.      REVIEW OF SYSTEMS:  Outside the issues mentioned in the HPI, there are no other complaints.  A 12-point review of systems was performed.      SURGICAL HISTORY:  Status post lumbar diskectomy.      PHYSICAL EXAMINATION:   GENERAL:  Exam today demonstrates a very pleasant, cooperative 35-year-old man who appears comfortable at rest.   VITAL SIGNS:  His blood pressure is 112/88, his heart rate 118 and regular.  His height is 1.74 meters, his weight is 91 kg.  His BMI is 30.7.   LUNGS:  Clear to percussion and auscultation.   CARDIOVASCULAR:  Shows a tachycardic S1 and S2.  There is no S3.  There is a soft 1/6 murmur left sternal border.   EXTREMITIES:  His pulses are full and symmetrical.  There is no pedal edema.   ABDOMEN:  Shows mild obesity.  Liver percusses to about 7 cm.  There may be ascites present.   NEUROLOGIC:  Cranial nerves II-XII are intact.  Strength equal and symmetrical.  He displays normal insight and judgment.      LABORATORY STUDIES:  ECG shows a sinus tachycardia with a  nonspecific intraventricular conduction delay and a rightward axis. I have reviewed the results of his TTE performed during his recent admission that demonstrated an LVEF 15 to 20% with LV dilation ( NAYELI 7cm) mild to moderate MR and diffuse LV hypokinesis.      ASSESSMENT:  Mr. 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  He has chronic systolic heart failure, and his last reported ejection fraction was 15%-20%.  In addition to abstinence from  all  cardiotoxic agents, we should proceed with cautious upward titration of neurohormonal inhibitors.  Metoprolol xl  may be a better choice than  carvedilol in view of his history of  hypotension and compliance issues.  Once we have maximized his neurohormonal inhibitors and confirmed continued abstinence from alcohol and amphetamines, we will plan for a repeat echo to determine whether he should be referred for an ICD device. If his LVEF remains less than 35% ICD implantation should be considered.      I repeatedly stressed the importance of compliance with medical therapy and avoiding cardiotoxic agents.      RECOMMENDATIONS:   1.  Avoid cardiotoxic toxic agents.   2.  Increase furosemide from 60 mg to 80 mg daily.  The patient will weigh himself daily and notify us with worsening weight gain or shortness of breath. He might benefit from more aggressive diuresis.    3.  Metoprolol XL 50 mg daily.   4.  Continue valsartan and spironolactone at current doses.   5.  Followup visit at advanced level practitioner in about 2 weeks for upward titration of neurohormonal inhibitor agents.   6.  Follow up with me in about 2 months with an echo at that time to decide regarding the patient's candidacy for ICD implantation.      We greatly appreciate the opportunity to see your patient, Mr. Jamin Le.      cc:   Lidia Lopez NP   [facility]   [address] [place] [address]         ELAINE CASTELLANOS MD              D: 2021   T: 2021   MT: POPEYE      Name:     ATIYA ZULETA   MRN:      [id number]        Account:      [id number]   :      1985           Service Date: 2021      Document: [id number]        Outpatient Encounter Medications as of 2021   Medication Sig Dispense Refill     cariprazine (VRAYLAR) 3 MG CAPS capsule Take 3 mg by mouth       DULoxetine HCl 40 MG CPEP Take 40 mg by mouth       furosemide (LASIX) 20 MG tablet Take 80 mg by mouth daily       gabapentin (NEURONTIN) 100 MG capsule Take 100 mg by mouth       gabapentin (NEURONTIN) 600 MG tablet TAKE 1 TABLET BY MOUTH EVERY MORNING AND TAKE 2 TABLETS BY MOUTH EVERY EVENING       meloxicam (MOBIC) 15 MG tablet Take 15 mg by mouth       metoprolol succinate ER (TOPROL-XL) 50 MG 24 hr tablet Take 1 tablet (50 mg) by mouth daily 60 tablet 11     naltrexone (DEPADE/REVIA) 50 MG tablet Take 50 mg by mouth       spironolactone (ALDACTONE) 25 MG tablet Take 12.5 mg by mouth       valsartan (DIOVAN) 160 MG tablet Take 160 mg by mouth       aspirin (ASA) 81 MG chewable tablet Take 81 mg by mouth       hydrOXYzine (ATARAX) 25 MG tablet Take  mg by mouth       No facility-administered encounter medications on file as of 2021.        Again, thank you for allowing me to participate in the care of your patient.      Sincerely,    Nico Perez MD     [facility]

## 2021-02-02 NOTE — PROGRESS NOTES
Service Date: 02/01/2021      HISTORY OF PRESENT ILLNESS:  Jamin Le, a 35-year-old man with established diagnosis of nonischemic dilated cardiomyopathy, substance abuse (methamphetamine/alcohol) and panic disorder with agoraphobia was seen today at your request for recommendations regarding long-term management of chronic systolic heart failure.      Mr. Le has an established diagnosis of chronic systolic heart failure in the setting of substance abuse.  A CT coronary angiogram in 2017 showed normal coronaries and TTE demonstrated a severe reduction in LV systolic performance. He was placed on neurohormonal inhibitors and has been followed as an outpatient  by the [facility] cardiology service at [facility].      The patient was most recently hospitalized for acute decompenstated  systolic heart failure between 11/30/2020 and 12/03/2020 at the [facility].  His beta blocker was held.. An echocardiogram demonstrated  a left ventricular end-diastolic dimension of 7 cm, LV ejection fraction of 15%-20% and diffuse left ventricular hypokinesis.  There was mild to  moderate mitral insufficiency.  RV chamber size was normal with moderately reduced RV systolic performance  He received aggressive diuresis and was discharged on a combination of valsartan, spironolactone and furosemide 60 mg daily.     Upon discharge from [facility] , the patient elected to follow up  in the [place] system and was seen for an initial visit on 12/09/2020 by his nurse practitioner. She prescribed his cardiac medications,   referred the patient to a psychiatrist for  management of substance abuse and requested cardiology follow up  at the [facility]. The patient admits he had been using illicit substances prior to his recent hospital admission, but has been able to remain sober since discharge. He fears he is prone to relapse and has actively sought help with his current  primary care providers.      Mr. Le indicates he initially struggled upon discharge from the hospital, but has gradually improved.  He s suffers from orthopnea, but is free of PND and sleeps either on his side or on extra pillows..  He experiences dyspnea at about 100 feet before he experiences dyspnea. At times he takes  an extra 20 mg of furosemide, which has modestly  improved his symptoms.      His weight today is 91 kg (200 pounds).      ALLERGIES:  Penicillin manifested BY hives.        HABITS:  The patient has a longstanding history of substance abuse that involves both alcohol and methamphetamines.  He states he has been sober for several weeks at this time.      REVIEW OF SYSTEMS:  Outside the issues mentioned in the HPI, there are no other complaints.  A 12-point review of systems was performed.      SURGICAL HISTORY:  Status post lumbar diskectomy.      PHYSICAL EXAMINATION:   GENERAL:  Exam today demonstrates a very pleasant, cooperative 35-year-old man who appears comfortable at rest.   VITAL SIGNS:  His blood pressure is 112/88, his heart rate 118 and regular.  His height is 1.74 meters, his weight is 91 kg.  His BMI is 30.7.   LUNGS:  Clear to percussion and auscultation.   CARDIOVASCULAR:  Shows a tachycardic S1 and S2.  There is no S3.  There is a soft 1/6 murmur left sternal border.   EXTREMITIES:  His pulses are full and symmetrical.  There is no pedal edema.   ABDOMEN:  Shows mild obesity.  Liver percusses to about 7 cm.  There may be ascites present.   NEUROLOGIC:  Cranial nerves II-XII are intact.  Strength equal and symmetrical.  He displays normal insight and judgment.      LABORATORY STUDIES:  ECG shows a sinus tachycardia with a nonspecific intraventricular conduction delay and a rightward axis. I have reviewed the results of his TTE performed during his recent admission that demonstrated an LVEF 15 to 20% with LV dilation ( NAYELI 7cm) mild to moderate MR and diffuse LV hypokinesis.       ASSESSMENT:  Mr. 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  He has chronic systolic heart failure, and his last reported ejection fraction was 15%-20%.  In addition to abstinence from  all  cardiotoxic agents, we should proceed with cautious upward titration of neurohormonal inhibitors.  Metoprolol xl  may be a better choice than  carvedilol in view of his history of  hypotension and compliance issues.  Once we have maximized his neurohormonal inhibitors and confirmed continued abstinence from alcohol and amphetamines, we will plan for a repeat echo to determine whether he should be referred for an ICD device. If his LVEF remains less than 35% ICD implantation should be considered.      I repeatedly stressed the importance of compliance with medical therapy and avoiding cardiotoxic agents.      RECOMMENDATIONS:   1.  Avoid cardiotoxic toxic agents.   2.  Increase furosemide from 60 mg to 80 mg daily.  The patient will weigh himself daily and notify us with worsening weight gain or shortness of breath. He might benefit from more aggressive diuresis.    3.  Metoprolol XL 50 mg daily.   4.  Continue valsartan and spironolactone at current doses.   5.  Followup visit at advanced level practitioner in about 2 weeks for upward titration of neurohormonal inhibitor agents.   6.  Follow up with me in about 2 months with an echo at that time to decide regarding the patient's candidacy for ICD implantation.      We greatly appreciate the opportunity to see your patient, Mr. Jamin Le.      cc:   Lidia Lopez NP   [place] [id number] [place] [id number]         ELAINE CASTELLANOS MD             D: 2021   T: 2021   MT: POPEYE      Name:     JAMIN LE   MRN:      [id number]        Account:      [id number]   :      1985           Service Date: 2021      Document: [id number]

## 2021-02-09 ENCOUNTER — VIRTUAL VISIT (OUTPATIENT)
Dept: PSYCHOLOGY | Facility: CLINIC | Age: 36
End: 2021-02-09
Attending: NURSE PRACTITIONER
Payer: COMMERCIAL

## 2021-02-09 DIAGNOSIS — F25.0 SCHIZOAFFECTIVE DISORDER, BIPOLAR TYPE (H): Primary | ICD-10-CM

## 2021-02-09 DIAGNOSIS — I42.9 CARDIOMYOPATHY, UNSPECIFIED TYPE (H): ICD-10-CM

## 2021-02-09 DIAGNOSIS — F41.0 PANIC ATTACK: ICD-10-CM

## 2021-02-09 DIAGNOSIS — F15.90 STIMULANT USE DISORDER: ICD-10-CM

## 2021-02-09 DIAGNOSIS — I42.7 DILATED CARDIOMYOPATHY SECONDARY TO DRUG (H): ICD-10-CM

## 2021-02-09 DIAGNOSIS — F10.20 ALCOHOL USE DISORDER, SEVERE, DEPENDENCE (H): ICD-10-CM

## 2021-02-09 DIAGNOSIS — I50.22 CHRONIC SYSTOLIC HEART FAILURE (H): ICD-10-CM

## 2021-02-09 PROCEDURE — 99204 OFFICE O/P NEW MOD 45 MIN: CPT | Mod: 95 | Performed by: NURSE PRACTITIONER

## 2021-02-09 ASSESSMENT — ANXIETY QUESTIONNAIRES
5. BEING SO RESTLESS THAT IT IS HARD TO SIT STILL: NEARLY EVERY DAY
7. FEELING AFRAID AS IF SOMETHING AWFUL MIGHT HAPPEN: NOT AT ALL
2. NOT BEING ABLE TO STOP OR CONTROL WORRYING: NEARLY EVERY DAY
GAD7 TOTAL SCORE: 18
1. FEELING NERVOUS, ANXIOUS, OR ON EDGE: NEARLY EVERY DAY
3. WORRYING TOO MUCH ABOUT DIFFERENT THINGS: NEARLY EVERY DAY
6. BECOMING EASILY ANNOYED OR IRRITABLE: NEARLY EVERY DAY

## 2021-02-09 ASSESSMENT — PATIENT HEALTH QUESTIONNAIRE - PHQ9
SUM OF ALL RESPONSES TO PHQ QUESTIONS 1-9: 18
5. POOR APPETITE OR OVEREATING: NEARLY EVERY DAY

## 2021-02-09 NOTE — PROGRESS NOTES
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 assessment team [phone]  [place] Suicide Prevention Lifeline: 487.909.3716 (TTY: 101.236.9097). Call anytime for help.  (www.suicidepreventionlifeline.org)  [place] on Mental Illness (www.otis.org): 591.967.2626 or 676-937-6948.   Mental Health Association (www.mentalhealth.org): 942.896.7199 or 568-263-2839.  [place] Crisis Text Line: Text [place] to 702827  Suicide LifeLine Chat: suicideMobyko.org/chat    ADMINISTRATIVE BILLIN min spent interviewing patient, reviewing referral documents, obtaining and reviewing outside records, communication with other health specialists, and preparing this report.    Video/Phone Start Time: 2021 1545  Video/Phone End Time: 2021 1641    Greater than 50% of time was spent in counseling and coordination of care regarding above diagnoses and treatment plan.     Patient Status:  Our psychiatry providers act as a specialty service for Primary Care Providers in the [facility] that seek to optimize medications for unstable patients.  Once medications have been optimized, our providers discharge the patient back to the referring Primary Care Provider for ongoing medication management.  This  type of system allows our providers to serve a high volume of patients. At this time Patient will continue to be seen for ongoing consultation and stabilization.

## 2021-02-10 ASSESSMENT — ANXIETY QUESTIONNAIRES: GAD7 TOTAL SCORE: 18

## 2021-02-16 RX ORDER — LORAZEPAM 1 MG/1
1 TABLET ORAL DAILY
Qty: 5 TABLET | Refills: 0 | Status: SHIPPED | OUTPATIENT
Start: 2021-02-16

## 2021-02-16 RX ORDER — DULOXETINE 40 MG/1
40 CAPSULE, DELAYED RELEASE ORAL DAILY
Qty: 30 CAPSULE | Refills: 1 | Status: SHIPPED | OUTPATIENT
Start: 2021-02-16

## 2021-02-16 NOTE — TELEPHONE ENCOUNTER
Returned call to pt. He states that none of his scripts were sent to the pharmacy after his visit with provider on 2/9/21. Provider note incomplete; unsure of plan. Pt states she was supposed to send a refill over for the vraylar 3 mg and that they discussed a script for klonopin as needed.       Call to patient 2/16/2021, 630 pm. Scripts sent to pharmcy as discussed during appt on 2/9/2021.  [facility]

## 2021-02-16 NOTE — TELEPHONE ENCOUNTER
Reason for Call: Received call from the patient, stated that all his medications were prescribed incorrectly.     Name of the pharmacy and phone number for the current request:  [facility] [phone]    Name of the medication requested: Per Patient, all his meds    Can we leave a detailed message on this number? Yes     Phone number patient can be reached at: [phone]    Best Time: Anytime     Call taken on 2/16/2021 at 12:21 PM by Ran Guerra

## 2021-02-18 ENCOUNTER — OFFICE VISIT (OUTPATIENT)
Dept: CARDIOLOGY | Facility: CLINIC | Age: 36
End: 2021-02-18
Payer: COMMERCIAL

## 2021-02-18 ENCOUNTER — HOSPITAL ENCOUNTER (EMERGENCY)
Facility: CLINIC | Age: 36
Discharge: LEFT BEFORE DISCHARGE | End: 2021-02-18
Attending: FAMILY MEDICINE | Admitting: FAMILY MEDICINE
Payer: COMMERCIAL

## 2021-02-18 VITALS
HEART RATE: 127 BPM | BODY MASS INDEX: 32.36 KG/M2 | WEIGHT: 216 LBS | DIASTOLIC BLOOD PRESSURE: 100 MMHG | TEMPERATURE: 98.7 F | SYSTOLIC BLOOD PRESSURE: 132 MMHG | RESPIRATION RATE: 18 BRPM | OXYGEN SATURATION: 99 %

## 2021-02-18 VITALS
HEIGHT: 69 IN | HEART RATE: 57 BPM | SYSTOLIC BLOOD PRESSURE: 120 MMHG | WEIGHT: 216.8 LBS | DIASTOLIC BLOOD PRESSURE: 90 MMHG | BODY MASS INDEX: 32.11 KG/M2 | OXYGEN SATURATION: 96 %

## 2021-02-18 DIAGNOSIS — I42.9 CARDIOMYOPATHY, UNSPECIFIED TYPE (H): ICD-10-CM

## 2021-02-18 DIAGNOSIS — R10.9 ABDOMINAL PAIN, UNSPECIFIED ABDOMINAL LOCATION: ICD-10-CM

## 2021-02-18 DIAGNOSIS — I50.22 CHRONIC SYSTOLIC HEART FAILURE (H): ICD-10-CM

## 2021-02-18 PROCEDURE — 99281 EMR DPT VST MAYX REQ PHY/QHP: CPT | Performed by: FAMILY MEDICINE

## 2021-02-18 PROCEDURE — 99215 OFFICE O/P EST HI 40 MIN: CPT | Performed by: PHYSICIAN ASSISTANT

## 2021-02-18 PROCEDURE — 99282 EMERGENCY DEPT VISIT SF MDM: CPT | Performed by: FAMILY MEDICINE

## 2021-02-18 RX ORDER — KETOROLAC TROMETHAMINE 30 MG/ML
30 INJECTION, SOLUTION INTRAMUSCULAR; INTRAVENOUS ONCE
Status: DISCONTINUED | OUTPATIENT
Start: 2021-02-18 | End: 2021-02-18 | Stop reason: HOSPADM

## 2021-02-18 RX ORDER — IOPAMIDOL 755 MG/ML
500 INJECTION, SOLUTION INTRAVASCULAR ONCE
Status: DISCONTINUED | OUTPATIENT
Start: 2021-02-18 | End: 2021-02-18 | Stop reason: HOSPADM

## 2021-02-18 RX ORDER — LORAZEPAM 2 MG/ML
1 INJECTION INTRAMUSCULAR ONCE
Status: DISCONTINUED | OUTPATIENT
Start: 2021-02-18 | End: 2021-02-18 | Stop reason: HOSPADM

## 2021-02-18 ASSESSMENT — PAIN SCALES - GENERAL: PAINLEVEL: SEVERE PAIN (7)

## 2021-02-18 ASSESSMENT — MIFFLIN-ST. JEOR
SCORE: 1895.84
SCORE: 1895.84

## 2021-02-18 NOTE — PROGRESS NOTES
Service Date: 02/18/2021      PRIMARY CARDIOLOGIST:  Nico Perez MD      REASON FOR VISIT:  [facility] enrollment.      HISTORY OF PRESENT ILLNESS:  Mr. Le is a 35-year-old gentleman with an unfortunate significant past medical history as follows:   1.  Nonischemic dilated cardiomyopathy diagnosed initially in 2017 at which point his EF was about 10% -20%.  He was seen at [facility].  This was felt to be methamphetamine and alcohol-induced.   2.  History of substance abuse, primarily methamphetamines and alcohol.   3.  Panic disorder and agoraphobia.   4.  Chronic systolic heart failure.   5.  No significant coronary artery disease.        He was most recently hospitalized in November at [facility] for acute decompensated heart failure.  His EF again was down to 15% -20% and he had a left ventricular end-diastolic dimension of 7 cm.  He underwent a right heart cath during that time that noted a severely reduced cardiac output at around 3 liters per minute and a cardiac index ranging from 1.35 by thermodilution to 1.53 by Sonia.      He was seen by Dr. Perez about 2 weeks ago, and he was felt to be potentially hypervolemic.  He increased his Lasix to 80 mg daily and asked him to follow up with us in [facility] for enrollment and optimization of his medications.      Unfortunately, Jamin comes in today feeling very poorly.  He notes that about 2-3 days ago, he had a syncopal episode.  He said he was sitting in his mom's car.  He stood up to get out, coughed a few times, felt dizzy and lightheaded and then passed out.  Apparently someone saw him and said he had seizure-like activity.  He also broke a tooth out of his mouth, his top tooth.  Paramedics were called and he said he was assessed by them and vitals were fine.  Apparently, he has had multiple other syncopal episodes over the last several weeks.      In addition, he complains of severe abdominal pain.  He noted that this  started about 2-3 days ago and he has been nauseous with it.  He rates it as a 7 out of 10 and it has been that high off and on for the last 2-3 days.  He is passing gas and he did not have a bowel movement this morning.  He also has a severe headache that he would give a 7/10.  His abdomen hurts all the time, worse with touching it when it hurt on the car ride here.  It is also worse with deep breathing.  He did have a visit set today for possible hernia.  He has apparently a known, what I believe, is a ventral hernia.      SOCIAL HISTORY:  He comes in today with his mom.  Again, he has a history of methamphetamines and alcohol.  He did not get into this today.      PHYSICAL EXAMINATION:   GENERAL:  The patient is ill-appearing, appears to be in pain.  His pupils are small and nearly pinpoint.  His affect is pleasant.  He does grimace in pain.   HEART:  His heart is regular, mildly tachycardic.   LUNGS:  His lungs are diminished bilaterally, clear in the upper lobes.  He winces in pain with deep breath.     ABDOMEN:  He does not have bowel sounds.  His abdomen is distended and tender to touch throughout his abdomen.   SKIN:  Warm and dry.      ASSESSMENT AND PLAN:  This is a 35-year-old gentleman with severe dilated cardiomyopathy and chronic systolic heart failure with a very poor cardiac output and an EF of 15%-20%, history of substance abuse, recent syncopal episode causing injury including knocking out a tooth that apparently has been recurrent and possible seizure activity related to that and now 2-3 days of worsening abdominal pain.        His abdominal pain is quite severe.  I am concerned for abdominal pathology such as appendicitis, cholecystitis, pancreatitis or just florid heart failure with severe volume overload, which is also possible, as he is up 16 pounds from his clinic visit 2 weeks ago.  Also concerning is his multiple syncopal episodes and unclear if those are related to arrhythmia or  orthostasis.  Certainly, he will need an event monitor down the road.      Given the severity of his condition, we discussed the best place for him is the ER, so he can be further evaluated.  I did call the ER and then gave him a brief report on his symptoms.  I appreciate their assuming his care.  Further followup from Cardiology will be dependent on his likely hospitalization now.      Thank you for allowing me to participate in this patient's care.         ML ARREDONDO PA-C             D: 2021   T: 2021   MT: POPEYE      Name:     ATIYA ZULETA   MRN:      [id number]        Account:      [id number]   :      1985           Service Date: 2021      Document: [id number]

## 2021-02-18 NOTE — ED NOTES
"Writer and EDT walked into patient's room to start IV and draw lab work, give IV medications when patient became verbally angry. He states \"I' don't want no IV, I'm fucking leaving.\" Patient grabbed all of his belongings and started to walk out. Writer attempted to redirect patient and reassure him but he continued to walk out. Offered AMA paperwork and stated, \"I won't sign a damn thing.\" Patient's mother drove him in. She told her son that she would not drive him home. Patient walked out of the building at 1200 by self.   "

## 2021-02-18 NOTE — LETTER
2/18/2021    Physician No Ref-Primary  No address on file    RE: Jamin Le       Dear Colleague,    I had the pleasure of seeing Jamin POPEYE Le in the [facility].    Known NICM EF 15%, poor co, hx of meth and etoh, presents with 2-3 days of severe abdominal pain 7/10, HA and syncopal episode 2 days ago with possible szr activity.  Exam shows tender and distended abdomen.  Broad ddx for abdominal pathology vs acute chf as well as possible arrhythmia causing VT.  Sent to ED for further evaluation.      [id number]  50 minutes spent on the date of the encounter doing chart review, review of outside records, review of test results, interpretation of tests, patient visit, documentation and discussion with other provider(s)     HPI and Plan:   See dictation    Orders this Visit:  No orders of the defined types were placed in this encounter.    No orders of the defined types were placed in this encounter.    There are no discontinued medications.      Encounter Diagnoses   Name Primary?     Cardiomyopathy, unspecified type (H)      Chronic systolic heart failure (H)        CURRENT MEDICATIONS:  Current Outpatient Medications   Medication Sig Dispense Refill     aspirin (ASA) 81 MG chewable tablet Take 81 mg by mouth       cariprazine (VRAYLAR) 4.5 MG CAPS capsule Take 1 capsule (4.5 mg) by mouth daily 30 capsule 1     DULoxetine HCl 40 MG CPEP Take 40 mg by mouth daily 30 capsule 1     furosemide (LASIX) 20 MG tablet Take 80 mg by mouth daily       gabapentin (NEURONTIN) 100 MG capsule Take 100 mg by mouth       gabapentin (NEURONTIN) 600 MG tablet TAKE 1 TABLET BY MOUTH EVERY MORNING AND TAKE 2 TABLETS BY MOUTH EVERY EVENING       hydrOXYzine (ATARAX) 25 MG tablet Take  mg by mouth       LORazepam (ATIVAN) 1 MG tablet Take 1 tablet (1 mg) by mouth daily 5 tablet 0     meloxicam (MOBIC) 15 MG tablet Take 15 mg by mouth       metoprolol succinate ER  (TOPROL-XL) 50 MG 24 hr tablet Take 1 tablet (50 mg) by mouth daily 60 tablet 11     naltrexone (DEPADE/REVIA) 50 MG tablet Take 50 mg by mouth       spironolactone (ALDACTONE) 25 MG tablet Take 12.5 mg by mouth       valsartan (DIOVAN) 160 MG tablet Take 160 mg by mouth         ALLERGIES     Allergies   Allergen Reactions     Penicillins Hives     Tolerated po cephalosporin in 2016         PAST MEDICAL HISTORY:  No past medical history on file.    PAST SURGICAL HISTORY:  No past surgical history on file.    FAMILY HISTORY:  No family history on file.    SOCIAL HISTORY:  Social History     Socioeconomic History     Marital status: Single     Spouse name: None     Number of children: None     Years of education: None     Highest education level: None   Occupational History     None   Social Needs     Financial resource strain: None     Food insecurity     Worry: None     Inability: None     Transportation needs     Medical: None     Non-medical: None   Tobacco Use     Smoking status: Current Every Day Smoker     Types: Cigarettes     Smokeless tobacco: Former User     Tobacco comment: Smoks a couple cigaretts a day    Substance and Sexual Activity     Alcohol use: None     Drug use: None     Sexual activity: None   Lifestyle     Physical activity     Days per week: None     Minutes per session: None     Stress: None   Relationships     Social connections     Talks on phone: None     Gets together: None     Attends Druze service: None     Active member of club or organization: None     Attends meetings of clubs or organizations: None     Relationship status: None     Intimate partner violence     Fear of current or ex partner: None     Emotionally abused: None     Physically abused: None     Forced sexual activity: None   Other Topics Concern     None   Social History Narrative     None       Review of Systems:  Skin:  Negative     Eyes:  Negative    ENT:  Negative    Respiratory:  Positive for shortness of  "breath  Cardiovascular:    Positive for;dizziness;lightheadedness  Gastroenterology: Positive for poor appetite;abdominal pain  Genitourinary:  Negative    Musculoskeletal:  Negative    Neurologic:  Positive for headaches;migraine headaches  Psychiatric:  Positive for sleep disturbances  Heme/Lymph/Imm:  Negative    Endocrine:  Negative      Physical Exam:  Vitals: BP (!) 120/90   Pulse 57   Ht 1.74 m (5' 8.5\")   Wt 98.3 kg (216 lb 12.8 oz)   SpO2 96%   BMI 32.48 kg/m     Please refer to dictation for physical exam    Recent Lab Results:  LIPID RESULTS:  No results found for: CHOL, HDL, LDL, TRIG, CHOLHDLRATIO    LIVER ENZYME RESULTS:  Lab Results   Component Value Date    AST 48 (H) 12/29/2020     (H) 12/29/2020       CBC RESULTS:  No results found for: WBC, RBC, HGB, HCT, MCV, MCH, MCHC, RDW, PLT    BMP RESULTS:  Lab Results   Component Value Date     12/29/2020    POTASSIUM 5.1 12/29/2020    CHLORIDE 104 12/29/2020    CO2 30 12/29/2020    ANIONGAP 2 (L) 12/29/2020     (H) 12/29/2020    BUN 20 12/29/2020    CR 1.05 12/29/2020    GFRESTIMATED >90 12/29/2020    GFRESTBLACK >90 12/29/2020    GWENDOLYN 9.6 12/29/2020        A1C RESULTS:  No results found for: A1C    INR RESULTS:  No results found for: INR        CC  Nico Perez MD  [address]  [place] [id number]          Thank you for allowing me to participate in the care of your patient.      Sincerely,     Kayla Gibbons PA-C     [facility]  cc:   Nico Perez MD  [address]  [place] [id number]        "

## 2021-02-18 NOTE — PROGRESS NOTES
Known NICM EF 15%, poor co, hx of meth and etoh, presents with 2-3 days of severe abdominal pain 7/10, HA and syncopal episode 2 days ago with possible szr activity.  Exam shows tender and distended abdomen.  Broad ddx for abdominal pathology vs acute chf as well as possible arrhythmia causing VT.  Sent to ED for further evaluation.      [id number]  50 minutes spent on the date of the encounter doing chart review, review of outside records, review of test results, interpretation of tests, patient visit, documentation and discussion with other provider(s)     HPI and Plan:   See dictation    Orders this Visit:  No orders of the defined types were placed in this encounter.    No orders of the defined types were placed in this encounter.    There are no discontinued medications.      Encounter Diagnoses   Name Primary?     Cardiomyopathy, unspecified type (H)      Chronic systolic heart failure (H)        CURRENT MEDICATIONS:  Current Outpatient Medications   Medication Sig Dispense Refill     aspirin (ASA) 81 MG chewable tablet Take 81 mg by mouth       cariprazine (VRAYLAR) 4.5 MG CAPS capsule Take 1 capsule (4.5 mg) by mouth daily 30 capsule 1     DULoxetine HCl 40 MG CPEP Take 40 mg by mouth daily 30 capsule 1     furosemide (LASIX) 20 MG tablet Take 80 mg by mouth daily       gabapentin (NEURONTIN) 100 MG capsule Take 100 mg by mouth       gabapentin (NEURONTIN) 600 MG tablet TAKE 1 TABLET BY MOUTH EVERY MORNING AND TAKE 2 TABLETS BY MOUTH EVERY EVENING       hydrOXYzine (ATARAX) 25 MG tablet Take  mg by mouth       LORazepam (ATIVAN) 1 MG tablet Take 1 tablet (1 mg) by mouth daily 5 tablet 0     meloxicam (MOBIC) 15 MG tablet Take 15 mg by mouth       metoprolol succinate ER (TOPROL-XL) 50 MG 24 hr tablet Take 1 tablet (50 mg) by mouth daily 60 tablet 11     naltrexone (DEPADE/REVIA) 50 MG tablet Take 50 mg by mouth       spironolactone (ALDACTONE) 25 MG tablet Take 12.5 mg by mouth       valsartan (DIOVAN) 160  MG tablet Take 160 mg by mouth         ALLERGIES     Allergies   Allergen Reactions     Penicillins Hives     Tolerated po cephalosporin in 2016         PAST MEDICAL HISTORY:  No past medical history on file.    PAST SURGICAL HISTORY:  No past surgical history on file.    FAMILY HISTORY:  No family history on file.    SOCIAL HISTORY:  Social History     Socioeconomic History     Marital status: Single     Spouse name: None     Number of children: None     Years of education: None     Highest education level: None   Occupational History     None   Social Needs     Financial resource strain: None     Food insecurity     Worry: None     Inability: None     Transportation needs     Medical: None     Non-medical: None   Tobacco Use     Smoking status: Current Every Day Smoker     Types: Cigarettes     Smokeless tobacco: Former User     Tobacco comment: Smoks a couple cigaretts a day    Substance and Sexual Activity     Alcohol use: None     Drug use: None     Sexual activity: None   Lifestyle     Physical activity     Days per week: None     Minutes per session: None     Stress: None   Relationships     Social connections     Talks on phone: None     Gets together: None     Attends Moravian service: None     Active member of club or organization: None     Attends meetings of clubs or organizations: None     Relationship status: None     Intimate partner violence     Fear of current or ex partner: None     Emotionally abused: None     Physically abused: None     Forced sexual activity: None   Other Topics Concern     None   Social History Narrative     None       Review of Systems:  Skin:  Negative     Eyes:  Negative    ENT:  Negative    Respiratory:  Positive for shortness of breath  Cardiovascular:    Positive for;dizziness;lightheadedness  Gastroenterology: Positive for poor appetite;abdominal pain  Genitourinary:  Negative    Musculoskeletal:  Negative    Neurologic:  Positive for headaches;migraine  "headaches  Psychiatric:  Positive for sleep disturbances  Heme/Lymph/Imm:  Negative    Endocrine:  Negative      Physical Exam:  Vitals: BP (!) 120/90   Pulse 57   Ht 1.74 m (5' 8.5\")   Wt 98.3 kg (216 lb 12.8 oz)   SpO2 96%   BMI 32.48 kg/m     Please refer to dictation for physical exam    Recent Lab Results:  LIPID RESULTS:  No results found for: CHOL, HDL, LDL, TRIG, CHOLHDLRATIO    LIVER ENZYME RESULTS:  Lab Results   Component Value Date    AST 48 (H) 12/29/2020     (H) 12/29/2020       CBC RESULTS:  No results found for: WBC, RBC, HGB, HCT, MCV, MCH, MCHC, RDW, PLT    BMP RESULTS:  Lab Results   Component Value Date     12/29/2020    POTASSIUM 5.1 12/29/2020    CHLORIDE 104 12/29/2020    CO2 30 12/29/2020    ANIONGAP 2 (L) 12/29/2020     (H) 12/29/2020    BUN 20 12/29/2020    CR 1.05 12/29/2020    GFRESTIMATED >90 12/29/2020    GFRESTBLACK >90 12/29/2020    GWENDOLYN 9.6 12/29/2020        A1C RESULTS:  No results found for: A1C    INR RESULTS:  No results found for: INR        CC  Nico Perez MD  [address]  [place] [id number]      "

## 2021-02-18 NOTE — ED PROVIDER NOTES
History     Chief Complaint   Patient presents with     Abdominal Pain     HPI  Jamin Le is a 35 year old male who presents with a 1 week history of abdominal pain.  Patient states that the pain has been constant over this period of time.  Nothing seems to make it better, everything makes it worse.  He is not been able to eat or drink much because it makes the bloating feel worse.  He had a normal bowel movement this morning.  Denies any recent fevers or chills.  He states he has felt lightheaded today and felt like he is going to pass out.  Denies any recent dysuria or hematuria.  Patient states he has not had any surgeries on his abdomen before.    Allergies:  Allergies   Allergen Reactions     Penicillins Hives     Tolerated po cephalosporin in 2016         Problem List:    Patient Active Problem List    Diagnosis Date Noted     Schizoaffective disorder, bipolar type (H) 02/09/2021     Priority: Medium     Panic attack 02/09/2021     Priority: Medium     Cervical disc syndrome 01/19/2021     Priority: Medium     Cardiomyopathy, dilated, nonischemic (H) 12/29/2020     Priority: Medium     Acute systolic (congestive) heart failure (H) 11/30/2020     Priority: Medium     CHF (congestive heart failure), NYHA class III, chronic, systolic (H) 11/27/2020     Priority: Medium     S/P lumbar discectomy 03/26/2018     Priority: Medium     L5S1 on left, 1/31/18       Chronic midline low back pain with left-sided sciatica 12/15/2017     Priority: Medium     Cannabis use disorder, mild, in early remission 08/01/2017     Priority: Medium     Generalized anxiety disorder 08/01/2017     Priority: Medium     Major depressive disorder, recurrent, moderate (H) 08/01/2017     Priority: Medium     Panic disorder with agoraphobia 08/01/2017     Priority: Medium     Cardiomyopathy (H) 07/24/2017     Priority: Medium     Dilated cardiomyopathy secondary to drug (H) 07/01/2017     Priority: Medium     Stimulant use disorder  07/01/2017     Priority: Medium     Scar condition and fibrosis of skin 10/13/2016     Priority: Medium     Alcohol use disorder, severe, dependence (H) 08/01/2016     Priority: Medium     S/P vasectomy 04/29/2016     Priority: Medium     Carpal tunnel syndrome of right wrist 01/29/2014     Priority: Medium     Insomnia 12/12/2013     Priority: Medium     Attention deficit hyperactivity disorder (ADHD), combined type 01/01/2013     Priority: Medium     Tobacco use disorder 10/16/2012     Priority: Medium        Past Medical History:    No past medical history on file.    Past Surgical History:    No past surgical history on file.    Family History:    No family history on file.    Social History:  Marital Status:  Single [1]  Social History     Tobacco Use     Smoking status: Current Every Day Smoker     Types: Cigarettes     Smokeless tobacco: Former User     Tobacco comment: Smoks a couple cigaretts a day    Substance Use Topics     Alcohol use: Not on file     Drug use: Not on file        Medications:    aspirin (ASA) 81 MG chewable tablet  cariprazine (VRAYLAR) 4.5 MG CAPS capsule  DULoxetine HCl 40 MG CPEP  furosemide (LASIX) 20 MG tablet  gabapentin (NEURONTIN) 100 MG capsule  gabapentin (NEURONTIN) 600 MG tablet  hydrOXYzine (ATARAX) 25 MG tablet  LORazepam (ATIVAN) 1 MG tablet  meloxicam (MOBIC) 15 MG tablet  metoprolol succinate ER (TOPROL-XL) 50 MG 24 hr tablet  naltrexone (DEPADE/REVIA) 50 MG tablet  spironolactone (ALDACTONE) 25 MG tablet  valsartan (DIOVAN) 160 MG tablet          Review of Systems   All other systems reviewed and are negative.      Physical Exam   BP: (!) 132/100  Pulse: 127  Temp: 98.7  F (37.1  C)  Resp: 18  Weight: 98 kg (216 lb)  SpO2: 99 %      Physical Exam  Vitals signs and nursing note reviewed.   Constitutional:       General: He is not in acute distress.     Appearance: He is well-developed. He is not diaphoretic.   HENT:      Head: Normocephalic and atraumatic.   Eyes:       Conjunctiva/sclera: Conjunctivae normal.   Neck:      Musculoskeletal: Normal range of motion.   Cardiovascular:      Rate and Rhythm: Normal rate and regular rhythm.      Heart sounds: Normal heart sounds. No murmur.   Pulmonary:      Effort: Pulmonary effort is normal. No respiratory distress.      Breath sounds: Normal breath sounds. No stridor. No wheezing.   Abdominal:      General: Bowel sounds are normal. There is no distension.      Palpations: Abdomen is soft.      Tenderness: There is abdominal tenderness in the right upper quadrant, epigastric area and left upper quadrant. There is no guarding.      Comments: Patient appears bloated     Musculoskeletal: Normal range of motion.   Skin:     General: Skin is warm and dry.      Findings: No rash.   Neurological:      Mental Status: He is alert and oriented to person, place, and time.   Psychiatric:         Judgment: Judgment normal.         ED Course        Procedures    As soon as I left the room, I ordered labs and testing for work-up of his symptoms but apparently when nursing went into place the IV he states he did want stay anymore and up and left.  Nursing try to get him to stop and he left anyway.  I was not around and unable to talk to the patient myself.    Assessments & Plan (with Medical Decision Making)  Abdominal pain     I have reviewed the nursing notes.    I have reviewed the findings, diagnosis, plan and need for follow up with the patient.              2/18/2021   [facility] EMERGENCY DEPT     Dane Miller MD  02/18/21 2089

## 2021-02-23 PROBLEM — F33.1 MAJOR DEPRESSIVE DISORDER, RECURRENT, MODERATE (H): Status: RESOLVED | Noted: 2017-08-01 | Resolved: 2021-02-23

## 2021-02-23 PROBLEM — I42.7: Status: ACTIVE | Noted: 2017-07-01

## 2021-02-23 PROBLEM — I42.9 CARDIOMYOPATHY (H): Status: RESOLVED | Noted: 2017-07-24 | Resolved: 2021-02-23

## 2021-02-23 RX ORDER — TRAZODONE HYDROCHLORIDE 50 MG/1
50 TABLET, FILM COATED ORAL AT BEDTIME
COMMUNITY

## 2021-02-23 RX ORDER — LAMOTRIGINE 25 MG/1
50 TABLET ORAL DAILY
COMMUNITY

## 2021-02-23 NOTE — PATIENT INSTRUCTIONS
TREATMENT PLAN                                                                                                  1. MEDICATIONS:   Will call pharmacy to verify medication doses.  Increase the Vraylar to 4.5 mg and continue remaining medications at current doses.  Will provide small amount of lorazepam for panic.     Drug Monitoring:  [place] Prescription Monitoring Program evaluating controlled substances in the last year in [place], [place], [place], [place], or [place]:  [place] Prescription Monitoring Program [] was checked today:  indicates gabapentin..     2. CONTINGENCY PLAN:  Consider further increase Vraylar to 6 mg    3. CONSULTS/REFERRALS:   None at this time.  Working on therapeutic relationship at this time.  Likely could benefit from supportive therapy, substance use therapy and CBT  Coordinate care with therapist as needed    4. MEDICAL:   None at this time, will consider metabolic labs at next appt  Imaging/studies: none  Coordinate care with PCP (No Ref-Primary, Physician) as needed    5. COMMUNITY/PSYCHOSOCIAL INTERVENTIONS/OTHER:   Coordinate care with other community providers as needed    6. OTHER RECOMMENDED ASSESSMENTS:   - None    7. FOLLOW UP: Schedule an appointment with me in four weeks or sooner as needed. Call [phone] to schedule  Follow up with primary care provider as planned or for acute medical concerns.  Call the psychiatric nurse line with medication questions or concerns at [phone] or [phone]    8. PSYCHOEDUCATION:  Medication side effects and alternatives reviewed. Health promotion activities recommended and reviewed today. All questions addressed. Education and counseling completed regarding risks and benefits of medications and psychotherapy options.  Call the psychiatric nurse line with medication questions or concerns at [phone].  MyChart may be used to communicate with your provider, but this is not intended to be used for emergencies.  Benzodiazepines:  discussion on how benzos  work and the need to use them short term due to potential of anxiety getting.  This is a controlled substance with risk for abuse, need to keep in a safe keep place and cannot replace lost scripts.    Atypical need for cardiometabolic monitoring with medication- B/P, weight, blood sugar, cholesterol.  Need to monitor for abnormal movements taught    COMMUNITY RESOURCES:    CRISIS NUMBERS: Provided in AVS 2/9/2021  Crisis Connection - [phone]  CHILD: [facility] has a needs assessment team [phone]  [place] Suicide Prevention Lifeline: 370.293.7181 (TTY: 309.935.1604). Call anytime for help.  (www.suicidepreventionlifeline.org)  [employer] [place] [employer]ness (www.otis.org): 541.997.4562 or 205-298-0946.   Mental Health Association (www.mentalhealth.org): 689.523.4750 or 759-859-9714.  [place] Crisis Text Line: Text [place] to 193217  Suicide LifeLine Chat: suicidepreventionlifeline.org/chat

## 2021-03-03 ENCOUNTER — APPOINTMENT (OUTPATIENT)
Dept: PSYCHIATRY | Facility: CLINIC | Age: 36
End: 2021-03-03
Payer: COMMERCIAL